# Patient Record
Sex: MALE | Race: WHITE | NOT HISPANIC OR LATINO
[De-identification: names, ages, dates, MRNs, and addresses within clinical notes are randomized per-mention and may not be internally consistent; named-entity substitution may affect disease eponyms.]

---

## 2023-09-22 ENCOUNTER — APPOINTMENT (OUTPATIENT)
Dept: ORTHOPEDIC SURGERY | Facility: CLINIC | Age: 53
End: 2023-09-22
Payer: OTHER MISCELLANEOUS

## 2023-09-22 PROBLEM — Z00.00 ENCOUNTER FOR PREVENTIVE HEALTH EXAMINATION: Status: ACTIVE | Noted: 2023-09-22

## 2023-09-22 PROCEDURE — 99204 OFFICE O/P NEW MOD 45 MIN: CPT

## 2023-09-22 PROCEDURE — 72040 X-RAY EXAM NECK SPINE 2-3 VW: CPT

## 2023-09-22 RX ORDER — GABAPENTIN 300 MG/1
300 CAPSULE ORAL 3 TIMES DAILY
Qty: 90 | Refills: 2 | Status: ACTIVE | COMMUNITY
Start: 2023-09-22 | End: 1900-01-01

## 2023-09-22 RX ORDER — METHYLPREDNISOLONE 4 MG/1
4 TABLET ORAL
Qty: 1 | Refills: 0 | Status: ACTIVE | COMMUNITY
Start: 2023-09-22 | End: 1900-01-01

## 2023-09-26 ENCOUNTER — APPOINTMENT (OUTPATIENT)
Dept: PAIN MANAGEMENT | Facility: CLINIC | Age: 53
End: 2023-09-26
Payer: OTHER MISCELLANEOUS

## 2023-09-26 VITALS — BODY MASS INDEX: 32.23 KG/M2 | WEIGHT: 238 LBS | HEIGHT: 72 IN

## 2023-09-26 DIAGNOSIS — E78.5 HYPERLIPIDEMIA, UNSPECIFIED: ICD-10-CM

## 2023-09-26 DIAGNOSIS — E11.9 TYPE 2 DIABETES MELLITUS W/OUT COMPLICATIONS: ICD-10-CM

## 2023-09-26 DIAGNOSIS — E03.9 HYPOTHYROIDISM, UNSPECIFIED: ICD-10-CM

## 2023-09-26 DIAGNOSIS — Z78.9 OTHER SPECIFIED HEALTH STATUS: ICD-10-CM

## 2023-09-26 DIAGNOSIS — Z87.891 PERSONAL HISTORY OF NICOTINE DEPENDENCE: ICD-10-CM

## 2023-09-26 DIAGNOSIS — Z82.49 FAMILY HISTORY OF ISCHEMIC HEART DISEASE AND OTHER DISEASES OF THE CIRCULATORY SYSTEM: ICD-10-CM

## 2023-09-26 DIAGNOSIS — I10 ESSENTIAL (PRIMARY) HYPERTENSION: ICD-10-CM

## 2023-09-26 DIAGNOSIS — Z83.3 FAMILY HISTORY OF DIABETES MELLITUS: ICD-10-CM

## 2023-09-26 PROCEDURE — 20552 NJX 1/MLT TRIGGER POINT 1/2: CPT

## 2023-09-26 PROCEDURE — 99204 OFFICE O/P NEW MOD 45 MIN: CPT | Mod: 25

## 2023-09-26 RX ORDER — CYCLOBENZAPRINE HYDROCHLORIDE 5 MG/1
5 TABLET, FILM COATED ORAL TWICE DAILY
Qty: 60 | Refills: 2 | Status: ACTIVE | COMMUNITY
Start: 2023-09-26 | End: 1900-01-01

## 2023-09-26 RX ORDER — DICLOFENAC POTASSIUM 50 MG/1
50 TABLET, COATED ORAL 3 TIMES DAILY
Qty: 60 | Refills: 1 | Status: ACTIVE | COMMUNITY
Start: 2023-09-26 | End: 1900-01-01

## 2023-09-29 RX ORDER — TRAMADOL HYDROCHLORIDE 50 MG/1
50 TABLET, COATED ORAL
Qty: 10 | Refills: 0 | Status: ACTIVE | COMMUNITY
Start: 2023-09-29 | End: 1900-01-01

## 2023-10-11 RX ORDER — PREDNISONE 10 MG/1
10 TABLET ORAL
Qty: 42 | Refills: 0 | Status: ACTIVE | COMMUNITY
Start: 2023-10-11 | End: 1900-01-01

## 2023-10-25 ENCOUNTER — APPOINTMENT (OUTPATIENT)
Dept: PAIN MANAGEMENT | Facility: CLINIC | Age: 53
End: 2023-10-25
Payer: OTHER MISCELLANEOUS

## 2023-10-25 PROCEDURE — 62321 NJX INTERLAMINAR CRV/THRC: CPT

## 2023-11-07 ENCOUNTER — APPOINTMENT (OUTPATIENT)
Dept: PAIN MANAGEMENT | Facility: CLINIC | Age: 53
End: 2023-11-07
Payer: OTHER MISCELLANEOUS

## 2023-11-07 PROCEDURE — 99214 OFFICE O/P EST MOD 30 MIN: CPT

## 2023-12-04 PROBLEM — M54.12 ACUTE CERVICAL RADICULOPATHY: Status: ACTIVE | Noted: 2023-09-22

## 2023-12-04 PROBLEM — M50.20 HNP (HERNIATED NUCLEUS PULPOSUS), CERVICAL: Status: ACTIVE | Noted: 2023-09-22

## 2023-12-05 ENCOUNTER — APPOINTMENT (OUTPATIENT)
Dept: PAIN MANAGEMENT | Facility: CLINIC | Age: 53
End: 2023-12-05
Payer: OTHER MISCELLANEOUS

## 2023-12-05 VITALS — BODY MASS INDEX: 31.44 KG/M2 | HEIGHT: 74 IN | WEIGHT: 245 LBS

## 2023-12-05 DIAGNOSIS — M54.12 RADICULOPATHY, CERVICAL REGION: ICD-10-CM

## 2023-12-05 DIAGNOSIS — M50.20 OTHER CERVICAL DISC DISPLACEMENT, UNSPECIFIED CERVICAL REGION: ICD-10-CM

## 2023-12-05 PROCEDURE — 99214 OFFICE O/P EST MOD 30 MIN: CPT

## 2024-09-13 ENCOUNTER — APPOINTMENT (OUTPATIENT)
Dept: PAIN MANAGEMENT | Facility: CLINIC | Age: 54
End: 2024-09-13
Payer: OTHER MISCELLANEOUS

## 2024-09-13 VITALS — BODY MASS INDEX: 26.44 KG/M2 | HEIGHT: 74 IN | WEIGHT: 206 LBS

## 2024-09-13 DIAGNOSIS — M54.12 RADICULOPATHY, CERVICAL REGION: ICD-10-CM

## 2024-09-13 DIAGNOSIS — M50.20 OTHER CERVICAL DISC DISPLACEMENT, UNSPECIFIED CERVICAL REGION: ICD-10-CM

## 2024-09-13 PROCEDURE — 99214 OFFICE O/P EST MOD 30 MIN: CPT

## 2024-09-13 NOTE — PHYSICAL EXAM
[de-identified] : Gen: NAD Head: NC/AT Neck: full ROM Eyes: no glasses, no scleral icterus ENT: mucous membranes moist CV: RRR, S1 S2, no mrg Lungs: CTAB, nonlabored breathing Abd: soft, NT/ND Ext: full ROM in all extremities, no peripheral edema Neuro: CN intact UEs +5 L +5 R shoulder abduction +5 L +5 R arm abduction +5 L +5 R forearm flexion +5 L +5 R forearm extension +5 L +5 R finger flexion +5 L +5 R  strength Psych: normal affect Skin: no visible lesions

## 2024-09-13 NOTE — DISCUSSION/SUMMARY
[de-identified] : WC - 9/15/2023 injury  SALLY HU is a 53 year-old man presenting for a RPV for a history of neck pain and cervical radiculopathy.  Prior treatment: 10/25/2023: C7-T1 FAITH w/ 70% relief of pain and improved function since the procedure for 9 months, was able to return to work Gabapentin Cyclobenzaprine Oral steroid taper  Plan: 1) Continue physical therapy 2) Schedule C7-T1 FAITH w/o MAC. The procedure was explained to the patient in detail. Reviewed risks, benefits, and alternatives with the patient. Some risks discussed included temporary increase in pain, bleeding, infection, and side effects from steroids. The patient expressed understanding and would like to proceed. 4) Refill gabapentin 300mg tid; Discussed AEs, including sedation, dizziness, somnolence, depression. Patient told to use caution when driving or working after taking the first few doses. 5) Continue cyclobenzaprine 5mg 6) Continue diclofenac 50mg BID prn; patient has only been taking this rarely, denies AEs 7) RTC post procedure  Patient has temporary partial disability, was able to return to work on 12/11/2023.

## 2024-09-13 NOTE — HISTORY OF PRESENT ILLNESS
[Neck] : neck [Right Arm] : right arm [5] : 5 [Dull/Aching] : dull/aching [Radiating] : radiating [Shooting] : shooting [Stabbing] : stabbing [Throbbing] : throbbing [Tightness] : tightness [Tingling] : tingling [Constant] : constant [Household chores] : household chores [Leisure] : leisure [Sleep] : sleep [Meds] : meds [Standing] : standing [Physical therapy] : physical therapy [] : no [FreeTextEntry1] : R shoulder  [FreeTextEntry6] : numbness and tingling in R arm and fingers  [FreeTextEntry7] : R arm  [FreeTextEntry9] : over the counter pain medications  [de-identified] : lifting  [de-identified] : x-ray and MRI

## 2024-09-13 NOTE — DISCUSSION/SUMMARY
[de-identified] : WC - 9/15/2023 injury  SALLY HU is a 53 year-old man presenting for a RPV for a history of neck pain and cervical radiculopathy.  Prior treatment: 10/25/2023: C7-T1 FAITH w/ 70% relief of pain and improved function since the procedure for 9 months, was able to return to work Gabapentin Cyclobenzaprine Oral steroid taper  Plan: 1) Continue physical therapy 2) Schedule C7-T1 FAITH w/o MAC. The procedure was explained to the patient in detail. Reviewed risks, benefits, and alternatives with the patient. Some risks discussed included temporary increase in pain, bleeding, infection, and side effects from steroids. The patient expressed understanding and would like to proceed. 4) Refill gabapentin 300mg tid; Discussed AEs, including sedation, dizziness, somnolence, depression. Patient told to use caution when driving or working after taking the first few doses. 5) Continue cyclobenzaprine 5mg 6) Continue diclofenac 50mg BID prn; patient has only been taking this rarely, denies AEs 7) RTC post procedure  Patient has temporary partial disability, was able to return to work on 12/11/2023.

## 2024-09-13 NOTE — PHYSICAL EXAM
[de-identified] : Gen: NAD Head: NC/AT Neck: full ROM Eyes: no glasses, no scleral icterus ENT: mucous membranes moist CV: RRR, S1 S2, no mrg Lungs: CTAB, nonlabored breathing Abd: soft, NT/ND Ext: full ROM in all extremities, no peripheral edema Neuro: CN intact UEs +5 L +5 R shoulder abduction +5 L +5 R arm abduction +5 L +5 R forearm flexion +5 L +5 R forearm extension +5 L +5 R finger flexion +5 L +5 R  strength Psych: normal affect Skin: no visible lesions

## 2024-09-13 NOTE — HISTORY OF PRESENT ILLNESS
[Neck] : neck [Right Arm] : right arm [5] : 5 [Dull/Aching] : dull/aching [Radiating] : radiating [Shooting] : shooting [Stabbing] : stabbing [Throbbing] : throbbing [Tightness] : tightness [Tingling] : tingling [Constant] : constant [Household chores] : household chores [Leisure] : leisure [Sleep] : sleep [Meds] : meds [Standing] : standing [Physical therapy] : physical therapy [] : no [FreeTextEntry1] : R shoulder  [FreeTextEntry6] : numbness and tingling in R arm and fingers  [FreeTextEntry7] : R arm  [FreeTextEntry9] : over the counter pain medications  [de-identified] : lifting  [de-identified] : x-ray and MRI

## 2024-09-13 NOTE — DATA REVIEWED
[FreeTextEntry1] : MRI was completed of the cervical spine. The report was reviewed and noted in the chart and I independently reviewed and interpreted images and report. 9/17/2023: C6-C7: severe bilateral NF stenosis w/ impression on bilateral exiting C7 nerve roots, R>L w/ superimposed right paracentral foraminal broad based disc protrusion C5-C6: moderate left and mild right NF stenosis C4-C5: moderate left NF stenosis 2/2 left foraminal osteophyte.

## 2024-09-23 ENCOUNTER — APPOINTMENT (OUTPATIENT)
Dept: ORTHOPEDIC SURGERY | Facility: CLINIC | Age: 54
End: 2024-09-23
Payer: OTHER MISCELLANEOUS

## 2024-09-23 VITALS — BODY MASS INDEX: 28.84 KG/M2 | HEIGHT: 71 IN | WEIGHT: 206 LBS

## 2024-09-23 DIAGNOSIS — M50.20 OTHER CERVICAL DISC DISPLACEMENT, UNSPECIFIED CERVICAL REGION: ICD-10-CM

## 2024-09-23 DIAGNOSIS — M54.12 RADICULOPATHY, CERVICAL REGION: ICD-10-CM

## 2024-09-23 PROCEDURE — 99214 OFFICE O/P EST MOD 30 MIN: CPT

## 2024-09-23 RX ORDER — METFORMIN HYDROCHLORIDE 625 MG/1
TABLET ORAL
Refills: 0 | Status: ACTIVE | COMMUNITY

## 2024-09-23 RX ORDER — LEVOTHYROXINE SODIUM 0.17 MG/1
TABLET ORAL
Refills: 0 | Status: ACTIVE | COMMUNITY

## 2024-09-23 RX ORDER — TIRZEPATIDE 10 MG/.5ML
10 INJECTION, SOLUTION SUBCUTANEOUS
Refills: 0 | Status: ACTIVE | COMMUNITY

## 2024-09-23 NOTE — HISTORY OF PRESENT ILLNESS
[Radiating] : radiating [Shooting] : shooting [Tingling] : tingling [Frequent] : frequent [de-identified] : 9/23/24: pt here for neck pain for a WC case from 9/15/23. He saw him originally last year for this same issue, and Dr. Hogue referred him to Dr. Beal. His  says they are ready to close the case so pt is here for an eval. has a herniated/bulging C6/C7 disc, gets numbness to middle finger. He still works [] : no [de-identified] : x-rays here, MRI at [facility]

## 2024-09-23 NOTE — WORK
[Has the patient reached Maximum Medical Improvement? If yes, indicate date___] : Yes, on [unfilled] [Is there permanent partial impairment?] : Yes [FreeTextEntry6] : cervical spine [At the pre-injury job] : At the pre-injury job [___ lbs] : Frequently: [unfilled] lbs [] : Frequently [Medium Work] : Medium work [Could this patient perform his/her at-injury work activities with restrictions? If yes, specify below.] : Yes [Could this patient perform any work activities with or without restrictions? Explain below.] : Yes [Has the patient had an injury/illness since the date of injury which impacts residual functional capacity?] : No [Would the patient benefit from vocational rehabilitation? If Yes, explain below.] :  No [de-identified] : cervical spine [de-identified] : 11.1,3,4,5,7 [de-identified] : E [de-identified] : neck injury;right radiculopathy [de-identified] : MRI C6/7 right HNP impinging on nerve root, multilevel foraminal stenosis [de-identified] : Neck pain to right arm, numbness C7 distribution, No atrophy, no weakness, no loss of reflexes.  11.3- positive imaging displacing nerve =16, 11.4- sensory deficit dermatomal C7=4 11.5- C7 sensory=6 Totals 26 which in 11.7= Severity E

## 2024-09-23 NOTE — PHYSICAL EXAM
[NL (45)] : forward flexion 45 degrees [3] : right 3rd digit [] : Pain does not radiate to right arm with motion [FreeTextEntry8] : posterior tenderness C7 spinous process [de-identified] : extension 30 degrees [de-identified] : left lateral flexion 30 degrees [de-identified] : left lateral rotation 60 degrees [de-identified] : right lateral flexion 30 degrees [TWNoteComboBox6] : right lateral rotation 60 degrees

## 2024-11-20 ENCOUNTER — APPOINTMENT (OUTPATIENT)
Dept: PAIN MANAGEMENT | Facility: CLINIC | Age: 54
End: 2024-11-20

## 2024-11-20 DIAGNOSIS — M50.20 OTHER CERVICAL DISC DISPLACEMENT, UNSPECIFIED CERVICAL REGION: ICD-10-CM

## 2024-11-20 PROCEDURE — 82948 REAGENT STRIP/BLOOD GLUCOSE: CPT

## 2024-11-20 PROCEDURE — 62321 NJX INTERLAMINAR CRV/THRC: CPT

## 2024-11-20 NOTE — PROCEDURE
[FreeTextEntry1] : C7-T1 cervical interlaminar epidural steroid injection [FreeTextEntry2] : chronic cervical radiculopathy [FreeTextEntry3] : Procedure Date: 11/20/2024   Preoperative Diagnosis: cervical radiculopathy   Procedure: C7-T1 epidural steroid injection under fluoroscopic guidance   Anesthesia: local   Complications: none   EBL: none   Procedure in detail: Patient was seen and examined. Risks, benefits, and alternatives for the procedure were discussed with the patient in detail. The patient expressed understanding, gave written and verbal consent, and placed themselves in a prone position on the procedure table. Skin overlying the posterior cervical spine was prepped with chloraprep and draped in the usual sterile fashion. Fluoroscopic images were obtained to identify the C7 and T1 vertebral body. Target was the interlaminar space between the C7 and T1 vertebral body. Skin overlying the target was marked and infiltrated with 1% lidocaine. Using an 20 gauge, 4.5 inch tuohy needle, this was inserted and advanced under intermittent fluoroscopic guidance. When felt to be engaged in the ligamentum flavum, contralateral oblique view was used to confirm depth. Needle then advanced using loss of resistance to saline technique until loss was achieved. After negative aspiration for heme/csf, contrast was injected under live fluoroscopy, which showed contrast spread consistent with epidural flow. No evidence of intravascular or intrathecal uptake. At this point, a total of 2ml of injectate, which consisted of 1ml of 80mg/ml depomedrol and 1ml of normal saline was injected. The needle was restyletted and withdrawn, a band aid was placed over the injection site. Patient tolerated the procedure well. The patient recovered uneventfully and was discharged home in stable condition.

## 2024-12-03 ENCOUNTER — APPOINTMENT (OUTPATIENT)
Dept: PAIN MANAGEMENT | Facility: CLINIC | Age: 54
End: 2024-12-03
Payer: OTHER MISCELLANEOUS

## 2024-12-03 VITALS — WEIGHT: 210 LBS | HEIGHT: 71 IN | BODY MASS INDEX: 29.4 KG/M2

## 2024-12-03 DIAGNOSIS — M54.12 RADICULOPATHY, CERVICAL REGION: ICD-10-CM

## 2024-12-03 DIAGNOSIS — M50.20 OTHER CERVICAL DISC DISPLACEMENT, UNSPECIFIED CERVICAL REGION: ICD-10-CM

## 2024-12-03 PROCEDURE — 99214 OFFICE O/P EST MOD 30 MIN: CPT

## 2024-12-03 NOTE — PHYSICAL EXAM
[de-identified] : Gen: NAD Head: NC/AT Neck: full ROM Eyes: no glasses, no scleral icterus ENT: mucous membranes moist CV: RRR, S1 S2, no mrg Lungs: CTAB, nonlabored breathing Abd: soft, NT/ND Ext: full ROM in all extremities, no peripheral edema Neuro: CN intact UEs +5 L +5 R shoulder abduction +5 L +5 R arm abduction +5 L +5 R forearm flexion +5 L +5 R forearm extension +5 L +5 R finger flexion +5 L +5 R  strength Psych: normal affect Skin: no visible lesions

## 2024-12-03 NOTE — HISTORY OF PRESENT ILLNESS
[Neck] : neck [Right Arm] : right arm [5] : 5 [Dull/Aching] : dull/aching [Radiating] : radiating [Shooting] : shooting [Stabbing] : stabbing [Throbbing] : throbbing [Tightness] : tightness [Tingling] : tingling [Constant] : constant [Household chores] : household chores [Leisure] : leisure [Sleep] : sleep [Meds] : meds [Standing] : standing [Physical therapy] : physical therapy [2] : 2 [] : no [FreeTextEntry1] : R shoulder  [FreeTextEntry6] : numbness and tingling in R arm and fingers  [FreeTextEntry7] : R arm  [FreeTextEntry9] : over the counter pain medications  [de-identified] : lifting  [de-identified] : x-ray and MRI [TWNoteComboBox1] : 50%

## 2024-12-03 NOTE — DISCUSSION/SUMMARY
[de-identified] : WC - 9/15/2023 injury  TALA RAM is a 53 year-old man presenting for a RPV for a history of neck pain and cervical radiculopathy.  Prior treatment: 11/20/2024 C7-T1 DANIEL w/o MAC w/ 50% improvement of pain and ADLs (improved neck ROM), able to wean off gabapentin 10/25/2023: C7-T1 DANIEL w/ 70% relief of pain and improved function since the procedure for 9 months, was able to return to work Gabapentin Cyclobenzaprine Oral steroid taper  Plan: 1) Can repeat and schedule C7-T1 DANIEL w/o MAC prn. The procedure was explained to the patient in detail. Reviewed risks, benefits, and alternatives with the patient. Some risks discussed included temporary increase in pain, bleeding, infection, and side effects from steroids. 2) Weaned off of gabapentin at this time  3) RTC prn  Patient has temporary partial disability, was able to return to work on 12/11/2023.

## 2024-12-03 NOTE — PHYSICAL EXAM
[de-identified] : Gen: NAD Head: NC/AT Neck: full ROM Eyes: no glasses, no scleral icterus ENT: mucous membranes moist CV: RRR, S1 S2, no mrg Lungs: CTAB, nonlabored breathing Abd: soft, NT/ND Ext: full ROM in all extremities, no peripheral edema Neuro: CN intact UEs +5 L +5 R shoulder abduction +5 L +5 R arm abduction +5 L +5 R forearm flexion +5 L +5 R forearm extension +5 L +5 R finger flexion +5 L +5 R  strength Psych: normal affect Skin: no visible lesions

## 2024-12-03 NOTE — DISCUSSION/SUMMARY
[de-identified] : WC - 9/15/2023 injury  TALA RAM is a 53 year-old man presenting for a RPV for a history of neck pain and cervical radiculopathy.  Prior treatment: 11/20/2024 C7-T1 DANIEL w/o MAC w/ 50% improvement of pain and ADLs (improved neck ROM), able to wean off gabapentin 10/25/2023: C7-T1 DANIEL w/ 70% relief of pain and improved function since the procedure for 9 months, was able to return to work Gabapentin Cyclobenzaprine Oral steroid taper  Plan: 1) Can repeat and schedule C7-T1 DANIEL w/o MAC prn. The procedure was explained to the patient in detail. Reviewed risks, benefits, and alternatives with the patient. Some risks discussed included temporary increase in pain, bleeding, infection, and side effects from steroids. 2) Weaned off of gabapentin at this time  3) RTC prn  Patient has temporary partial disability, was able to return to work on 12/11/2023.

## 2024-12-03 NOTE — HISTORY OF PRESENT ILLNESS
[Neck] : neck [Right Arm] : right arm [5] : 5 [Dull/Aching] : dull/aching [Radiating] : radiating [Shooting] : shooting [Stabbing] : stabbing [Throbbing] : throbbing [Tightness] : tightness [Tingling] : tingling [Constant] : constant [Household chores] : household chores [Leisure] : leisure [Sleep] : sleep [Meds] : meds [Standing] : standing [Physical therapy] : physical therapy [2] : 2 [] : no [FreeTextEntry1] : R shoulder  [FreeTextEntry6] : numbness and tingling in R arm and fingers  [FreeTextEntry7] : R arm  [FreeTextEntry9] : over the counter pain medications  [de-identified] : lifting  [de-identified] : x-ray and MRI [TWNoteComboBox1] : 50%

## 2025-08-08 ENCOUNTER — APPOINTMENT (OUTPATIENT)
Dept: PAIN MANAGEMENT | Facility: CLINIC | Age: 55
End: 2025-08-08
Payer: OTHER MISCELLANEOUS

## 2025-08-08 VITALS — HEIGHT: 71 IN | WEIGHT: 197 LBS | BODY MASS INDEX: 27.58 KG/M2

## 2025-08-08 DIAGNOSIS — M54.12 RADICULOPATHY, CERVICAL REGION: ICD-10-CM

## 2025-08-08 DIAGNOSIS — M50.20 OTHER CERVICAL DISC DISPLACEMENT, UNSPECIFIED CERVICAL REGION: ICD-10-CM

## 2025-08-08 PROCEDURE — 99455 WORK RELATED DISABILITY EXAM: CPT
